# Patient Record
Sex: FEMALE | Race: WHITE | NOT HISPANIC OR LATINO | Employment: UNEMPLOYED | URBAN - METROPOLITAN AREA
[De-identification: names, ages, dates, MRNs, and addresses within clinical notes are randomized per-mention and may not be internally consistent; named-entity substitution may affect disease eponyms.]

---

## 2019-03-06 ENCOUNTER — TRANSCRIBE ORDERS (OUTPATIENT)
Dept: PERINATAL CARE | Facility: CLINIC | Age: 34
End: 2019-03-06

## 2019-03-06 DIAGNOSIS — O09.899 SUPERVISION OF OTHER HIGH RISK PREGNANCIES, UNSPECIFIED TRIMESTER: Primary | ICD-10-CM

## 2019-04-05 ENCOUNTER — ROUTINE PRENATAL (OUTPATIENT)
Dept: PERINATAL CARE | Facility: CLINIC | Age: 34
End: 2019-04-05
Payer: COMMERCIAL

## 2019-04-05 VITALS
BODY MASS INDEX: 26.7 KG/M2 | HEIGHT: 64 IN | HEART RATE: 75 BPM | DIASTOLIC BLOOD PRESSURE: 68 MMHG | WEIGHT: 156.4 LBS | SYSTOLIC BLOOD PRESSURE: 106 MMHG

## 2019-04-05 DIAGNOSIS — Z3A.13 13 WEEKS GESTATION OF PREGNANCY: ICD-10-CM

## 2019-04-05 DIAGNOSIS — O09.899 SUPERVISION OF OTHER HIGH RISK PREGNANCIES, UNSPECIFIED TRIMESTER: ICD-10-CM

## 2019-04-05 DIAGNOSIS — O09.521 MULTIGRAVIDA OF ADVANCED MATERNAL AGE IN FIRST TRIMESTER: Primary | ICD-10-CM

## 2019-04-05 DIAGNOSIS — O09.291 PRIOR PREGNANCY WITH CONGENITAL CARDIAC DEFECT IN FIRST TRIMESTER, ANTEPARTUM: ICD-10-CM

## 2019-04-05 DIAGNOSIS — Z36.82 ENCOUNTER FOR NUCHAL TRANSLUCENCY TESTING: ICD-10-CM

## 2019-04-05 PROBLEM — Z82.49 FAMILY HISTORY OF HYPERTROPHIC CARDIOMYOPATHY: Status: ACTIVE | Noted: 2017-01-23

## 2019-04-05 PROBLEM — Z84.89 FAMILY HISTORY OF SUDDEN DEATH: Status: ACTIVE | Noted: 2017-02-19

## 2019-04-05 PROBLEM — Z13.6 SCREENING FOR CARDIOVASCULAR CONDITION: Status: ACTIVE | Noted: 2017-01-23

## 2019-04-05 PROCEDURE — 76801 OB US < 14 WKS SINGLE FETUS: CPT | Performed by: OBSTETRICS & GYNECOLOGY

## 2019-04-05 PROCEDURE — 99241 PR OFFICE CONSULTATION NEW/ESTAB PATIENT 15 MIN: CPT | Performed by: OBSTETRICS & GYNECOLOGY

## 2019-04-05 PROCEDURE — 76813 OB US NUCHAL MEAS 1 GEST: CPT | Performed by: OBSTETRICS & GYNECOLOGY

## 2019-04-15 ENCOUNTER — TELEPHONE (OUTPATIENT)
Dept: PERINATAL CARE | Facility: CLINIC | Age: 34
End: 2019-04-15

## 2019-05-01 ENCOUNTER — TRANSCRIBE ORDERS (OUTPATIENT)
Dept: PERINATAL CARE | Facility: CLINIC | Age: 34
End: 2019-05-01

## 2019-05-01 DIAGNOSIS — O09.90 SUPERVISION OF HIGH-RISK PREGNANCY: Primary | ICD-10-CM

## 2019-05-24 ENCOUNTER — ROUTINE PRENATAL (OUTPATIENT)
Dept: PERINATAL CARE | Facility: CLINIC | Age: 34
End: 2019-05-24
Payer: COMMERCIAL

## 2019-05-24 VITALS
WEIGHT: 160 LBS | BODY MASS INDEX: 27.31 KG/M2 | HEART RATE: 82 BPM | SYSTOLIC BLOOD PRESSURE: 102 MMHG | HEIGHT: 64 IN | DIASTOLIC BLOOD PRESSURE: 52 MMHG

## 2019-05-24 DIAGNOSIS — O09.90 SUPERVISION OF HIGH-RISK PREGNANCY: ICD-10-CM

## 2019-05-24 DIAGNOSIS — Z3A.20 20 WEEKS GESTATION OF PREGNANCY: ICD-10-CM

## 2019-05-24 DIAGNOSIS — Z36.86 ENCOUNTER FOR ANTENATAL SCREENING FOR CERVICAL LENGTH: ICD-10-CM

## 2019-05-24 DIAGNOSIS — O09.522 MULTIGRAVIDA OF ADVANCED MATERNAL AGE IN SECOND TRIMESTER: Primary | ICD-10-CM

## 2019-05-24 DIAGNOSIS — O09.292 PRIOR PREGNANCY WITH CONGENITAL CARDIAC DEFECT IN SECOND TRIMESTER, ANTEPARTUM: ICD-10-CM

## 2019-05-24 PROBLEM — Z36.82 ENCOUNTER FOR NUCHAL TRANSLUCENCY TESTING: Status: RESOLVED | Noted: 2019-04-05 | Resolved: 2019-05-24

## 2019-05-24 PROCEDURE — 99212 OFFICE O/P EST SF 10 MIN: CPT | Performed by: OBSTETRICS & GYNECOLOGY

## 2019-05-24 PROCEDURE — 76811 OB US DETAILED SNGL FETUS: CPT | Performed by: OBSTETRICS & GYNECOLOGY

## 2019-05-24 PROCEDURE — 76817 TRANSVAGINAL US OBSTETRIC: CPT | Performed by: OBSTETRICS & GYNECOLOGY

## 2019-06-21 ENCOUNTER — ROUTINE PRENATAL (OUTPATIENT)
Dept: PERINATAL CARE | Facility: CLINIC | Age: 34
End: 2019-06-21
Payer: COMMERCIAL

## 2019-06-21 VITALS
WEIGHT: 168.8 LBS | DIASTOLIC BLOOD PRESSURE: 74 MMHG | SYSTOLIC BLOOD PRESSURE: 111 MMHG | BODY MASS INDEX: 28.82 KG/M2 | HEIGHT: 64 IN | HEART RATE: 83 BPM

## 2019-06-21 DIAGNOSIS — O09.522 MULTIGRAVIDA OF ADVANCED MATERNAL AGE IN SECOND TRIMESTER: ICD-10-CM

## 2019-06-21 DIAGNOSIS — O35.2XX0 PREVIOUS CHILD BORN WITH VENTRICULAR SEPTAL DEFECT, CURRENTLY PREGNANT, SINGLE OR UNSPECIFIED FETUS: Primary | ICD-10-CM

## 2019-06-21 DIAGNOSIS — O09.292 PRIOR PREGNANCY WITH CONGENITAL CARDIAC DEFECT IN SECOND TRIMESTER, ANTEPARTUM: ICD-10-CM

## 2019-06-21 DIAGNOSIS — Z3A.24 24 WEEKS GESTATION OF PREGNANCY: ICD-10-CM

## 2019-06-21 PROCEDURE — 76825 ECHO EXAM OF FETAL HEART: CPT | Performed by: OBSTETRICS & GYNECOLOGY

## 2019-06-21 PROCEDURE — 76827 ECHO EXAM OF FETAL HEART: CPT | Performed by: OBSTETRICS & GYNECOLOGY

## 2019-06-21 PROCEDURE — 93325 DOPPLER ECHO COLOR FLOW MAPG: CPT | Performed by: OBSTETRICS & GYNECOLOGY

## 2022-06-10 NOTE — PROGRESS NOTES
Assessment/Plan:    Encounter for gynecological examination (general) (routine) without abnormal findings  Here for well check, OCPs led to [facility]  Now with heavy, long cycles with menorrhagia 3/10 days, no pain   with vas but never had f/u testing  Normal breast and pelvic exams, pap done  Interested in [address] [place] [address] IUD  R&B reviewed  Will attempt to verify benefits and RTO for insertion  Diagnoses and all orders for this visit:    Encounter for gynecological examination (general) (routine) without abnormal findings    Screening for malignant neoplasm of the cervix  -     IGP, Aptima HPV, Rfx 16/18,45          Subjective:      Patient ID: Zulmea Roque is a 42 y o  female  HPI Here for well check  The following portions of the patient's history were reviewed and updated as appropriate: She  has a past medical history of Chlamydia and MVP (mitral valve prolapse)  She   Patient Active Problem List    Diagnosis Date Noted    Encounter for gynecological examination (general) (routine) without abnormal findings 06/14/2022    Surveillance for birth control, oral contraceptives 06/11/2022    Family history of sudden death 02/19/2017    Family history of hypertrophic cardiomyopathy 01/23/2017     She  has a past surgical history that includes Dilation and evacuation  Her family history includes Cardiomyopathy (age of onset: 29) in her father; Dementia in her maternal grandmother; Diabetes in her maternal grandfather; Glaucoma in her maternal grandmother; Hypertension in her mother; No Known Problems in her sister, son, and son  She  reports that she has never smoked  She has never used smokeless tobacco  She reports previous alcohol use of about 1 0 standard drink of alcohol per week  She reports that she does not use drugs    Current Outpatient Medications   Medication Sig Dispense Refill    Prenat w/o A-IN-Evqatyc-FA-DHA (PNV-DHA PO) Take 1 tablet by mouth daily       No current facility-administered medications for this visit  She is allergic to other       Review of Systems  No breast, bladder, bowel changes   No new persistent pain, bloating, early satiety or pelvic pressure  +menorrhagia -dysmenorrhea    Objective:      /88 (BP Location: Left arm, Patient Position: Sitting, Cuff Size: Standard)   Ht 5' 4" (1 626 m)   Wt 67 9 kg (149 lb 9 6 oz)   LMP 05/19/2022 (Exact Date)   BMI 25 68 kg/m²          Physical Exam    General appearance: no distress, pleasant  Neck: thyroid without nodules or thyromegaly, no palpable adenopathy  Lymph nodes: no palpable adenopathy  Breasts: no masses, nodes or skin changes  Abdomen: soft, non tender, no palpable masses  Pelvic exam: normal external genitalia, urethral meatus normal, vagina without lesions, cervix without lesions, uterus small, non tender, no adnexal masses, non tender  Rectal exam: deferred

## 2022-06-11 PROBLEM — Z30.41 SURVEILLANCE FOR BIRTH CONTROL, ORAL CONTRACEPTIVES: Status: ACTIVE | Noted: 2022-06-11

## 2022-06-14 ENCOUNTER — ANNUAL EXAM (OUTPATIENT)
Dept: OBGYN CLINIC | Facility: CLINIC | Age: 37
End: 2022-06-14
Payer: COMMERCIAL

## 2022-06-14 VITALS
BODY MASS INDEX: 25.54 KG/M2 | WEIGHT: 149.6 LBS | DIASTOLIC BLOOD PRESSURE: 88 MMHG | SYSTOLIC BLOOD PRESSURE: 115 MMHG | HEIGHT: 64 IN

## 2022-06-14 DIAGNOSIS — Z01.419 ENCOUNTER FOR GYNECOLOGICAL EXAMINATION (GENERAL) (ROUTINE) WITHOUT ABNORMAL FINDINGS: Primary | ICD-10-CM

## 2022-06-14 DIAGNOSIS — Z12.4 SCREENING FOR MALIGNANT NEOPLASM OF THE CERVIX: ICD-10-CM

## 2022-06-14 PROCEDURE — S0612 ANNUAL GYNECOLOGICAL EXAMINA: HCPCS | Performed by: OBSTETRICS & GYNECOLOGY

## 2022-06-14 NOTE — PATIENT INSTRUCTIONS
Return to office in one year unless having any problems such as breast changes, bleeding, new persistent pain, new progressive bloating, new problems eating (getting full to quickly) or new constant urinary pressure that does not resolve in one week  Remember to eat a meal, drink 2 glasses of water and to take 600 mg of ibuprofen one hour prior to your IUD appointment

## 2022-06-14 NOTE — ASSESSMENT & PLAN NOTE
Here for well check, OCPs led to SE  Now with heavy, long cycles with menorrhagia 3/10 days, no pain   with vas but never had f/u testing  Normal breast and pelvic exams, pap done  Interested in [id number] [facility] IUD  R&B reviewed  Will attempt to verify benefits and RTO for insertion

## 2022-06-14 NOTE — LETTER
June 14, 2022     Blanca Araya MD  [address]   [address]  [address] [facility] [address]    Patient: Zulema Roque   YOB: 1985   Date of Visit: 6/14/2022       Dear Dr Isac Wilson:    Thank you for referring Flory Morrell to me for evaluation  Below are my notes for this consultation  If you have questions, please do not hesitate to call me  I look forward to following your patient along with you  Sincerely,        Alla Novak MD        CC: No Recipients  Alla Novak MD  6/14/2022 10:55 AM  Sign when Signing Visit  Assessment/Plan:    Encounter for gynecological examination (general) (routine) without abnormal findings  Here for well check, OCPs led to SE  Now with heavy, long cycles with menorrhagia 3/10 days, no pain   with vas but never had f/u testing  Normal breast and pelvic exams, pap done  Interested in [address] [place] [address] IUD  R&B reviewed  Will attempt to verify benefits and RTO for insertion  Diagnoses and all orders for this visit:    Encounter for gynecological examination (general) (routine) without abnormal findings    Screening for malignant neoplasm of the cervix  -     IGP, Aptima HPV, Rfx 16/18,45          Subjective:      Patient ID: Zulema Roque is a 42 y o  female  HPI Here for well check  The following portions of the patient's history were reviewed and updated as appropriate: She  has a past medical history of Chlamydia and MVP (mitral valve prolapse)  She   Patient Active Problem List    Diagnosis Date Noted    Encounter for gynecological examination (general) (routine) without abnormal findings 06/14/2022    Surveillance for birth control, oral contraceptives 06/11/2022    Family history of sudden death 02/19/2017    Family history of hypertrophic cardiomyopathy 01/23/2017     She  has a past surgical history that includes Dilation and evacuation    Her family history includes Cardiomyopathy (age of onset: 29) in her father; Dementia in her maternal grandmother; Diabetes in her maternal grandfather; Glaucoma in her maternal grandmother; Hypertension in her mother; No Known Problems in her sister, son, and son  She  reports that she has never smoked  She has never used smokeless tobacco  She reports previous alcohol use of about 1 0 standard drink of alcohol per week  She reports that she does not use drugs  Current Outpatient Medications   Medication Sig Dispense Refill    Prenat w/o F-GK-Ibxyrxr-FA-DHA (PNV-DHA PO) Take 1 tablet by mouth daily       No current facility-administered medications for this visit  She is allergic to other       Review of Systems  No breast, bladder, bowel changes   No new persistent pain, bloating, early satiety or pelvic pressure  +menorrhagia -dysmenorrhea    Objective:      /88 (BP Location: Left arm, Patient Position: Sitting, Cuff Size: Standard)   Ht 5' 4" (1 626 m)   Wt 67 9 kg (149 lb 9 6 oz)   LMP 05/19/2022 (Exact Date)   BMI 25 68 kg/m²          Physical Exam    General appearance: no distress, pleasant  Neck: thyroid without nodules or thyromegaly, no palpable adenopathy  Lymph nodes: no palpable adenopathy  Breasts: no masses, nodes or skin changes  Abdomen: soft, non tender, no palpable masses  Pelvic exam: normal external genitalia, urethral meatus normal, vagina without lesions, cervix without lesions, uterus small, non tender, no adnexal masses, non tender  Rectal exam: deferred

## 2022-06-16 LAB
CYTOLOGIST CVX/VAG CYTO: NORMAL
DX ICD CODE: NORMAL
HPV I/H RISK 4 DNA CVX QL PROBE+SIG AMP: NEGATIVE
OTHER STN SPEC: NORMAL
PATH REPORT.FINAL DX SPEC: NORMAL
SL AMB NOTE:: NORMAL
SL AMB SPECIMEN ADEQUACY: NORMAL
SL AMB TEST METHODOLOGY: NORMAL

## 2022-06-27 ENCOUNTER — PROCEDURE VISIT (OUTPATIENT)
Dept: OBGYN CLINIC | Facility: CLINIC | Age: 37
End: 2022-06-27
Payer: COMMERCIAL

## 2022-06-27 VITALS
WEIGHT: 149.4 LBS | SYSTOLIC BLOOD PRESSURE: 90 MMHG | BODY MASS INDEX: 24.89 KG/M2 | HEIGHT: 65 IN | DIASTOLIC BLOOD PRESSURE: 58 MMHG

## 2022-06-27 DIAGNOSIS — Z01.812 PRE-PROCEDURE LAB EXAM: Primary | ICD-10-CM

## 2022-06-27 DIAGNOSIS — Z30.430 ENCOUNTER FOR IUD INSERTION: ICD-10-CM

## 2022-06-27 LAB — SL AMB POCT URINE HCG: NEGATIVE

## 2022-06-27 PROCEDURE — 81025 URINE PREGNANCY TEST: CPT | Performed by: NURSE PRACTITIONER

## 2022-06-27 PROCEDURE — 58300 INSERT INTRAUTERINE DEVICE: CPT | Performed by: NURSE PRACTITIONER

## 2022-06-27 NOTE — PATIENT INSTRUCTIONS
IUD inserted as requested  Spotty irregular bleeding may persist up to 6 months  Return to office in 4 weeks after next menses  Call office with any bright red or excessive bleeding, fever, severe pelvic pain or foul discharge  Check string monthly  May repeat motrin dose 4 hours from last dose with food

## 2022-07-27 ENCOUNTER — OFFICE VISIT (OUTPATIENT)
Dept: OBGYN CLINIC | Facility: CLINIC | Age: 37
End: 2022-07-27
Payer: COMMERCIAL

## 2022-07-27 VITALS
SYSTOLIC BLOOD PRESSURE: 100 MMHG | DIASTOLIC BLOOD PRESSURE: 66 MMHG | HEIGHT: 65 IN | BODY MASS INDEX: 24.86 KG/M2 | WEIGHT: 149.2 LBS

## 2022-07-27 DIAGNOSIS — Z30.431 IUD CHECK UP: Primary | ICD-10-CM

## 2022-07-27 PROCEDURE — 99212 OFFICE O/P EST SF 10 MIN: CPT | Performed by: NURSE PRACTITIONER

## 2022-07-27 RX ORDER — LEVONORGESTREL 52 MG/1
INTRAUTERINE DEVICE INTRAUTERINE
COMMUNITY
Start: 2022-06-27

## 2022-07-27 NOTE — PROGRESS NOTES
Assessment/Plan:    IUD in place  Call with any concerns  Irregular vaginal bleeding may persist up to 6 months        Diagnoses and all orders for this visit:    IUD check up  Comments:  Strings noted IUD appears in proper position    Other orders  -     Levonorgestrel (Liletta, 52 MG,) 20 1 MCG/DAY IUD; by Intrauterine route          Subjective:      Patient ID: Andressa Alvarez is a 42 y o  female  Here for IUD check Liletta inserted 6/27/2022 Doing well Occasional headache  Rare light cramp nothing she needs medicine for Had spotting went away Came back a few days before her period was due 7/17/2022 still very light brown spotting  Period so much lighter already Very happy with it  The following portions of the patient's history were reviewed and updated as appropriate: allergies, current medications, past family history, past medical history, past social history, past surgical history and problem list     Review of Systems   Gastrointestinal: Negative for abdominal pain  Genitourinary: Negative for dyspareunia, menstrual problem, pelvic pain, vaginal bleeding and vaginal discharge  Neurological: Negative for headaches  Psychiatric/Behavioral: Negative for dysphoric mood  The patient is not nervous/anxious  Objective:      /66 (BP Location: Left arm, Patient Position: Sitting, Cuff Size: Standard)   Ht 5' 4 75" (1 645 m)   Wt 67 7 kg (149 lb 3 2 oz)   LMP 07/17/2022 Comment: mostly just spotting has IUD  Breastfeeding No   BMI 25 02 kg/m²          Physical Exam  Vitals and nursing note reviewed  Constitutional:       General: She is not in acute distress  Appearance: Normal appearance  HENT:      Head: Normocephalic and atraumatic  Chest:   Breasts: Breasts are symmetrical       Right: Normal  No mass, nipple discharge, skin change, tenderness or axillary adenopathy        Left: Normal  No mass, nipple discharge, skin change, tenderness or axillary adenopathy  Genitourinary:     General: Normal vulva  Exam position: Lithotomy position  Labia:         Right: No rash, tenderness, lesion or injury  Left: No rash, tenderness, lesion or injury  Urethra: No prolapse, urethral pain, urethral swelling or urethral lesion  Vagina: Normal  No erythema or lesions  Cervix: No cervical motion tenderness, discharge, lesion or cervical bleeding  Uterus: Normal        Comments: Strings noted at os  IUD appears in proper position  Lymphadenopathy:      Upper Body:      Right upper body: No axillary adenopathy  Left upper body: No axillary adenopathy  Lower Body: No right inguinal adenopathy  No left inguinal adenopathy  Skin:     General: Skin is warm and dry  Neurological:      Mental Status: She is alert and oriented to person, place, and time  Psychiatric:         Mood and Affect: Mood normal          Behavior: Behavior normal          Thought Content:  Thought content normal          Judgment: Judgment normal

## 2023-07-17 ENCOUNTER — OFFICE VISIT (OUTPATIENT)
Dept: OBGYN CLINIC | Facility: CLINIC | Age: 38
End: 2023-07-17
Payer: COMMERCIAL

## 2023-07-17 VITALS
BODY MASS INDEX: 24.59 KG/M2 | SYSTOLIC BLOOD PRESSURE: 110 MMHG | HEIGHT: 65 IN | WEIGHT: 147.6 LBS | DIASTOLIC BLOOD PRESSURE: 68 MMHG

## 2023-07-17 DIAGNOSIS — Z30.431 IUD CHECK UP: ICD-10-CM

## 2023-07-17 DIAGNOSIS — R10.2 PELVIC PAIN: ICD-10-CM

## 2023-07-17 DIAGNOSIS — Z12.31 ENCOUNTER FOR SCREENING MAMMOGRAM FOR MALIGNANT NEOPLASM OF BREAST: ICD-10-CM

## 2023-07-17 DIAGNOSIS — Z01.419 ENCOUNTER FOR GYNECOLOGICAL EXAMINATION (GENERAL) (ROUTINE) WITHOUT ABNORMAL FINDINGS: Primary | ICD-10-CM

## 2023-07-17 DIAGNOSIS — R35.0 FREQUENT URINATION: ICD-10-CM

## 2023-07-17 LAB
SL AMB  POCT GLUCOSE, UA: NEGATIVE
SL AMB LEUKOCYTE ESTERASE,UA: NORMAL
SL AMB POCT BLOOD,UA: NEGATIVE
SL AMB POCT KETONES,UA: NEGATIVE
SL AMB POCT NITRITE,UA: NEGATIVE
SL AMB POCT PH,UA: 5
SL AMB POCT SPECIFIC GRAVITY,UA: 1
SL AMB POCT URINE PROTEIN: NEGATIVE

## 2023-07-17 PROCEDURE — S0612 ANNUAL GYNECOLOGICAL EXAMINA: HCPCS | Performed by: NURSE PRACTITIONER

## 2023-07-17 PROCEDURE — 81002 URINALYSIS NONAUTO W/O SCOPE: CPT | Performed by: NURSE PRACTITIONER

## 2023-07-17 RX ORDER — SULFACETAMIDE SODIUM AND SULFUR 10; 5 MG/G; MG/G
RINSE TOPICAL
COMMUNITY
Start: 2023-06-30

## 2023-07-17 RX ORDER — PIMECROLIMUS 10 MG/G
CREAM TOPICAL
COMMUNITY
Start: 2023-04-17

## 2023-07-17 RX ORDER — IVERMECTIN 10 MG/G
CREAM TOPICAL
COMMUNITY
Start: 2023-05-02

## 2023-07-17 NOTE — PATIENT INSTRUCTIONS
Calcium 1000 mg + 600-1000 IU Vit D daily. Pap with high risk HPV Q 5 years,Annual mammogram, monthly breast self exam. B Exercise 150 minutes per week minimum.    Pelvic cramping no unusual bleeding Will check US r/o cyst

## 2023-07-17 NOTE — PROGRESS NOTES
Assessment/Plan:    Calcium 1000 mg + 600-1000 IU Vit D daily. Pap with high risk HPV Q 5 years,Annual mammogram, monthly breast self exam. B Exercise 150 minutes per week minimum. Pelvic cramping no unusual bleeding Will check US r/o cyst      Diagnoses and all orders for this visit:    Encounter for gynecological examination (general) (routine) without abnormal findings    IUD check up  -     US pelvis complete w transvaginal; Future    Encounter for screening mammogram for malignant neoplasm of breast  -     Mammo screening bilateral w 3d & cad; Future    Frequent urination  -     POCT urine dip    Pelvic pain  -     US pelvis complete w transvaginal; Future    Other orders  -     Liquid-based pap, screening  -     Ivermectin 1 % CREA; Apply every IN THE MORNING  -     pimecrolimus (ELIDEL) 1 % cream; Apply to affected area of face daily  -     Sulfacetamide Sodium-Sulfur 10-5 % LIQD; Apply topically to full face Daily          Subjective:      Patient ID: Deepti Mock is a 34 y.o. female.  here for annual gyn Eliseo Hodges inserted 2022 Last [place] 2022 Dr Guadelupe Bamberger PAP 2022 neg/neg Having slight frequent urination no urgency or dysuria  Has been having some left sided pelvic cramping the past 3 weeks Not bad but unusual No periods occasional spotting Bowel is normal no constipation issues       The following portions of the patient's history were reviewed and updated as appropriate: allergies, current medications, past family history, past medical history, past social history, past surgical history and problem list.    Review of Systems   Constitutional: Negative for fatigue and unexpected weight change. Gastrointestinal: Negative for abdominal distention, abdominal pain, constipation and diarrhea. Genitourinary: Positive for pelvic pain.  Negative for difficulty urinating, dyspareunia, dysuria, frequency, genital sores, menstrual problem, urgency, vaginal bleeding, vaginal discharge and vaginal pain. Neurological: Negative for headaches. Psychiatric/Behavioral: Negative. Negative for dysphoric mood. The patient is not nervous/anxious. Objective:      /68   Ht 5' 4.5" (1.638 m)   Wt 67 kg (147 lb 9.6 oz)   Breastfeeding No   BMI 24.94 kg/m²          Physical Exam  Vitals and nursing note reviewed. Constitutional:       General: She is not in acute distress. Appearance: Normal appearance. HENT:      Head: Normocephalic and atraumatic. Pulmonary:      Effort: Pulmonary effort is normal.   Chest:   Breasts:     Breasts are symmetrical.      Right: Normal. No mass, nipple discharge, skin change or tenderness. Left: Normal. No mass, nipple discharge, skin change or tenderness. Abdominal:      General: There is no distension. Palpations: Abdomen is soft. Tenderness: There is no abdominal tenderness. There is no guarding or rebound. Genitourinary:     General: Normal vulva. Exam position: Lithotomy position. Labia:         Right: No rash, tenderness, lesion or injury. Left: No rash, tenderness, lesion or injury. Urethra: No prolapse, urethral pain, urethral swelling or urethral lesion. Vagina: Normal. No erythema or lesions. Cervix: No cervical motion tenderness, discharge, lesion or cervical bleeding. Uterus: Normal.       Adnexa: Right adnexa normal and left adnexa normal.        Right: No mass or tenderness. Left: No mass or tenderness. Rectum: No mass or external hemorrhoid. Comments: IUD strings noted   Musculoskeletal:         General: Normal range of motion. Lymphadenopathy:      Upper Body:      Right upper body: No axillary adenopathy. Left upper body: No axillary adenopathy. Lower Body: No right inguinal adenopathy. No left inguinal adenopathy. Skin:     General: Skin is warm and dry.    Neurological:      Mental Status: She is alert and oriented to person, place, and time.   Psychiatric:         Mood and Affect: Mood normal.         Behavior: Behavior normal.         Thought Content:  Thought content normal.         Judgment: Judgment normal.

## 2023-07-28 DIAGNOSIS — Z12.31 ENCOUNTER FOR SCREENING MAMMOGRAM FOR MALIGNANT NEOPLASM OF BREAST: ICD-10-CM

## 2024-02-21 PROBLEM — Z01.419 ENCOUNTER FOR GYNECOLOGICAL EXAMINATION (GENERAL) (ROUTINE) WITHOUT ABNORMAL FINDINGS: Status: RESOLVED | Noted: 2022-06-14 | Resolved: 2024-02-21

## 2024-10-27 NOTE — PROGRESS NOTES
"Assessment/Plan:    Calcium 1000 mg + 600-1000 IU Vit D daily. Pap with high risk HPV Q 5 years,Exercise 150 minutes per week minimum.      Diagnoses and all orders for this visit:    Encounter for gynecological examination without abnormal finding    Encounter for screening mammogram for malignant neoplasm of breast  -     Mammo screening bilateral w 3d and cad; Future    IUD check up          Subjective:      Patient ID: Franchesca Tello is a 39 y.o. female.     here for annual gyn Christine inserted 2022 No periods Last [place] 2023 prev seen by  Dr Vanegas PAP 2022 neg/neg Had been having some left sided pelvic cramping  Trans vag US 2023 normal No issue since Bowel and bladder are normal Due for mammogram         The following portions of the patient's history were reviewed and updated as appropriate: allergies, current medications, past family history, past medical history, past social history, past surgical history, and problem list.    Review of Systems   Constitutional:  Negative for fatigue and unexpected weight change.   Gastrointestinal:  Negative for abdominal distention, abdominal pain, constipation and diarrhea.   Genitourinary:  Negative for difficulty urinating, dyspareunia, dysuria, frequency, genital sores, menstrual problem, pelvic pain, urgency, vaginal bleeding, vaginal discharge and vaginal pain.   Neurological:  Negative for headaches.   Psychiatric/Behavioral: Negative.  Negative for dysphoric mood. The patient is not nervous/anxious.          Objective:      /70 (BP Location: Right arm, Patient Position: Sitting, Cuff Size: Standard)   Ht 5' 4.5\" (1.638 m)   Wt 68 kg (150 lb)   BMI 25.35 kg/m²          Physical Exam  Vitals and nursing note reviewed.   Constitutional:       General: She is not in acute distress.     Appearance: Normal appearance.   HENT:      Head: Normocephalic and atraumatic.   Pulmonary:      Effort: Pulmonary effort is normal.   Chest: "   Breasts:     Breasts are symmetrical.      Right: Normal. No mass, nipple discharge, skin change or tenderness.      Left: Normal. No mass, nipple discharge, skin change or tenderness.   Abdominal:      General: There is no distension.      Palpations: Abdomen is soft.      Tenderness: There is no abdominal tenderness. There is no guarding or rebound.   Genitourinary:     General: Normal vulva.      Exam position: Lithotomy position.      Labia:         Right: No rash, tenderness, lesion or injury.         Left: No rash, tenderness, lesion or injury.       Urethra: No prolapse, urethral pain, urethral swelling or urethral lesion.      Vagina: Normal. No erythema or lesions.      Cervix: No cervical motion tenderness, discharge, lesion or cervical bleeding.      Uterus: Normal.       Adnexa: Right adnexa normal and left adnexa normal.        Right: No mass or tenderness.          Left: No mass or tenderness.        Rectum: No mass or external hemorrhoid.      Comments: IUD strings noted   Musculoskeletal:         General: Normal range of motion.   Lymphadenopathy:      Upper Body:      Right upper body: No axillary adenopathy.      Left upper body: No axillary adenopathy.      Lower Body: No right inguinal adenopathy. No left inguinal adenopathy.   Skin:     General: Skin is warm and dry.   Neurological:      Mental Status: She is alert and oriented to person, place, and time.   Psychiatric:         Mood and Affect: Mood normal.         Behavior: Behavior normal.         Thought Content: Thought content normal.         Judgment: Judgment normal.

## 2024-10-28 ENCOUNTER — ANNUAL EXAM (OUTPATIENT)
Dept: OBGYN CLINIC | Facility: CLINIC | Age: 39
End: 2024-10-28
Payer: COMMERCIAL

## 2024-10-28 VITALS
WEIGHT: 150 LBS | HEIGHT: 65 IN | DIASTOLIC BLOOD PRESSURE: 70 MMHG | BODY MASS INDEX: 24.99 KG/M2 | SYSTOLIC BLOOD PRESSURE: 112 MMHG

## 2024-10-28 DIAGNOSIS — Z01.419 ENCOUNTER FOR GYNECOLOGICAL EXAMINATION WITHOUT ABNORMAL FINDING: Primary | ICD-10-CM

## 2024-10-28 DIAGNOSIS — Z30.431 IUD CHECK UP: ICD-10-CM

## 2024-10-28 DIAGNOSIS — Z12.31 ENCOUNTER FOR SCREENING MAMMOGRAM FOR MALIGNANT NEOPLASM OF BREAST: ICD-10-CM

## 2024-10-28 PROCEDURE — S0612 ANNUAL GYNECOLOGICAL EXAMINA: HCPCS | Performed by: NURSE PRACTITIONER

## 2024-10-28 NOTE — PATIENT INSTRUCTIONS
Calcium 1000 mg + 600-1000 IU Vit D daily. Pap with high risk HPV Q 5 years,Exercise 150 minutes per week minimum.

## 2025-01-30 ENCOUNTER — RESULTS FOLLOW-UP (OUTPATIENT)
Dept: OBGYN CLINIC | Facility: CLINIC | Age: 40
End: 2025-01-30

## 2025-03-06 ENCOUNTER — OFFICE VISIT (OUTPATIENT)
Dept: OBGYN CLINIC | Facility: CLINIC | Age: 40
End: 2025-03-06
Payer: COMMERCIAL

## 2025-03-06 VITALS
BODY MASS INDEX: 25.33 KG/M2 | SYSTOLIC BLOOD PRESSURE: 132 MMHG | HEIGHT: 65 IN | DIASTOLIC BLOOD PRESSURE: 74 MMHG | WEIGHT: 152 LBS

## 2025-03-06 DIAGNOSIS — K40.90 UNILATERAL INGUINAL HERNIA WITHOUT OBSTRUCTION OR GANGRENE, RECURRENCE NOT SPECIFIED: Primary | ICD-10-CM

## 2025-03-06 PROCEDURE — 99213 OFFICE O/P EST LOW 20 MIN: CPT | Performed by: OBSTETRICS & GYNECOLOGY

## 2025-03-06 NOTE — PROGRESS NOTES
[facility] [place]  [address], [address], [place] [address]    Assessment & Plan  Unilateral inguinal hernia without obstruction or gangrene, recurrence not specified  Right bulging just midline of inquinal area, at mons pubis level.  Only notable when standing.  No signs of active herniation or incarceration.  No pain.  Recom see general surgery for evaluation.  Referral provided.  Reviewed symptoms of incarceration and when to seek emergency care.    Orders:    Ambulatory Referral to General Surgery; Future      I have spent a total time of 20 minutes in caring for this patient on the day of the visit/encounter including Prognosis, Risks and benefits of tx options, Patient and family education, Impressions, Counseling / Coordination of care, Documenting in the medical record, Reviewing/placing orders in the medical record (including tests, medications, and/or procedures), and Obtaining or reviewing history  .      Subjective:   Franchesca Tello is a 39 y.o.  female.    HPI:   Franchesca presents with lump in pelvic area she noted about 2 weeks ago.  States it's about quarter sized lump on side side of pelvis.  Noted in shower 2 weeks ago.  No real pain.  No change since first noted, maybe a little tender if she pushes on it.  Notes while standing but not while lying down.    No period with hormonal IUD.        Gyn History  No LMP recorded. Patient has had an implant.       Last pap smear: 2022    She  reports being sexually active and has had partner(s) who are male. She reports using the following methods of birth control/protection: I.U.D. and Male Sterilization.       OB History      Past Medical History:  No date: Chlamydia      Comment:  in college  No date: MVP (mitral valve prolapse)      Comment:  Seen Cardiologist in 2017 ([facility]) - No                appointment since 2017 - asymptomatic     Past Surgical History:  No date: DILATION AND EVACUATION      " Comment:  2012     Social History     Tobacco Use    Smoking status: Never    Smokeless tobacco: Never   Vaping Use    Vaping status: Never Used   Substance Use Topics    Alcohol use: Yes     Alcohol/week: 1.0 standard drink of alcohol     Types: 1 Standard drinks or equivalent per week     Comment: occasional    Drug use: Never          Current Outpatient Medications:     Levonorgestrel (Liletta, 52 MG,) 20.1 MCG/DAY IUD, by Intrauterine route, Disp: , Rfl:     Sulfacetamide Sodium-Sulfur 10-5 % LIQD, Apply topically to full face Daily, Disp: , Rfl:     She is allergic to bee venom and other..    ROS: Review of Systems   Constitutional: Negative.    Gastrointestinal: Negative.    Genitourinary: Negative.  Negative for pelvic pain (pelvic lump).   Psychiatric/Behavioral: Negative.         Objective:  /74 (BP Location: Left arm, Patient Position: Sitting, Cuff Size: Standard)   Ht 5' 4.5\" (1.638 m)   Wt 68.9 kg (152 lb)   BMI 25.69 kg/m²      Physical Exam  Constitutional:       Appearance: Normal appearance.   Abdominal:      General: Bowel sounds are normal.      Palpations: Abdomen is soft. There is no mass.          Comments: Soft swelling area noted only while standing.  No pain, no mass.  Very soft.  Likely inquinal hernia.   Genitourinary:     General: Normal vulva.      Vagina: Normal.      Cervix: Normal. No cervical motion tenderness (IUD string at os).      Uterus: Normal. Not enlarged and not tender.       Adnexa:         Right: No mass or tenderness.          Left: No mass or tenderness.        Rectum: No external hemorrhoid.   Neurological:      Mental Status: She is alert.   Psychiatric:         Mood and Affect: Mood normal.         Behavior: Behavior normal.         "